# Patient Record
Sex: FEMALE | HISPANIC OR LATINO
[De-identification: names, ages, dates, MRNs, and addresses within clinical notes are randomized per-mention and may not be internally consistent; named-entity substitution may affect disease eponyms.]

---

## 2021-03-04 PROBLEM — Z00.00 ENCOUNTER FOR PREVENTIVE HEALTH EXAMINATION: Status: ACTIVE | Noted: 2021-03-04

## 2021-03-09 ENCOUNTER — APPOINTMENT (OUTPATIENT)
Dept: BREAST CENTER | Facility: CLINIC | Age: 46
End: 2021-03-09
Payer: SELF-PAY

## 2021-03-09 VITALS
SYSTOLIC BLOOD PRESSURE: 111 MMHG | HEIGHT: 64 IN | WEIGHT: 148 LBS | DIASTOLIC BLOOD PRESSURE: 75 MMHG | BODY MASS INDEX: 25.27 KG/M2 | HEART RATE: 80 BPM

## 2021-03-09 DIAGNOSIS — N60.01 SOLITARY CYST OF RIGHT BREAST: ICD-10-CM

## 2021-03-09 DIAGNOSIS — N60.02 SOLITARY CYST OF LEFT BREAST: ICD-10-CM

## 2021-03-09 DIAGNOSIS — G43.909 MIGRAINE, UNSPECIFIED, NOT INTRACTABLE, W/OUT STATUS MIGRAINOSUS: ICD-10-CM

## 2021-03-09 DIAGNOSIS — R92.8 OTHER ABNORMAL AND INCONCLUSIVE FINDINGS ON DIAGNOSTIC IMAGING OF BREAST: ICD-10-CM

## 2021-03-09 PROCEDURE — 99203 OFFICE O/P NEW LOW 30 MIN: CPT

## 2021-03-09 PROCEDURE — 99072 ADDL SUPL MATRL&STAF TM PHE: CPT

## 2021-03-10 PROBLEM — R92.8 ABNORMAL FINDING ON BREAST IMAGING: Status: ACTIVE | Noted: 2021-03-10

## 2021-03-10 PROBLEM — N60.02 BENIGN CYST OF LEFT BREAST: Status: ACTIVE | Noted: 2021-03-10

## 2021-03-10 PROBLEM — G43.909 MIGRAINE: Status: ACTIVE | Noted: 2021-03-09

## 2021-03-10 PROBLEM — N60.01 BENIGN CYST OF BREAST, RIGHT: Status: ACTIVE | Noted: 2021-03-10

## 2021-03-11 NOTE — ASSESSMENT
[FreeTextEntry1] : 46yo female patient of Dr. Milly Caruso presents for initial consultation s/p BIRADS 3 imaging on 2/26/21 which noted a 0.5cm circumscribed hypoechoic nodule likely complicated cyst w/ debris 9:00 2FN for which 6mo f/u was recommended. No personal or FHx of breast or ovarian ca. Physical examination reveals bilateral nodularities c/w cysts on in office US. Patient to return in August for bilateral US and re-examination. \par

## 2021-03-11 NOTE — CONSULT LETTER
[Dear  ___] : Dear  [unfilled], [Consult Letter:] : I had the pleasure of evaluating your patient, [unfilled]. [Please see my note below.] : Please see my note below. [Consult Closing:] : Thank you very much for allowing me to participate in the care of this patient.  If you have any questions, please do not hesitate to contact me. [Sincerely,] : Sincerely, [FreeTextEntry3] : Mak\par \par Mak Gordillo MD\par Chief of Breast Surgery\par Director of Breast Cancer Program\par [facility]\par \par \par

## 2021-03-11 NOTE — DATA REVIEWED
[FreeTextEntry1] : 2/13/21: Tom MG: extremely dense, L – asymmetry for which additional images are rec. BIRADS 0.\par 2/26/21: L [facility] & [facility] US ([facility]): previous asymmetry effaces on spot compression, US – R – 0.7cm cluster of cysts 4:00 1FN, 0.7cm few cysts 4:00 2FN, 0.6cm cyst 4:00 1FN, 0.5cm circumscribed hypoechoic nodule likely complicated cyst w/ debris 9:00 2FN – 6mo f/u rec, 0.8 cm cluster of cysts 11:00 4FN, L  - 1.9cm cluster of cysts 12:00 6FN, 0.8cm cyst 2:00 5FN. BIRADS 3.\par

## 2021-03-11 NOTE — HISTORY OF PRESENT ILLNESS
[FreeTextEntry1] : 46yo female patient of Dr. Milly Caruso presents for initial consultation s/p BIRADS 3 imaging on 2/26/21 which noted a 0.5cm circumscribed hypoechoic nodule likely complicated cyst w/ debris 9:00 2FN for which 6mo f/u was recommended. No personal or FHx of breast or ovarian ca.\par

## 2021-03-11 NOTE — PAST MEDICAL HISTORY
[Menarche Age ____] : age at menarche was [unfilled] [Definite ___ (Date)] : the last menstrual period was [unfilled] [Total Preg ___] : G[unfilled] [Live Births ___] : P[unfilled]  [Age At Live Birth ___] : Age at live birth: [unfilled] [FreeTextEntry7] : yes  [FreeTextEntry8] : no

## 2021-03-11 NOTE — PHYSICAL EXAM
[Supple] : supple [No Supraclavicular Adenopathy] : no supraclavicular adenopathy [No Cervical Adenopathy] : no cervical adenopathy [Examined in the supine and seated position] : examined in the supine and seated position [No Nipple Retraction] : no left nipple retraction [No Nipple Discharge] : no left nipple discharge [No Axillary Lymphadenopathy] : no left axillary lymphadenopathy [de-identified] : palpable correlate to nodule noted on US at 9:00 2FN with no concerning US finding on In Office US [de-identified] : palpable nodule at area c/w cluster of cysts 12:00 noted on US from 2/26/21 measuring 0.5cm x 0.5cm transversely on in office US

## 2021-08-17 ENCOUNTER — APPOINTMENT (OUTPATIENT)
Dept: BREAST CENTER | Facility: CLINIC | Age: 46
End: 2021-08-17